# Patient Record
Sex: FEMALE | Race: ASIAN | Employment: UNEMPLOYED | URBAN - METROPOLITAN AREA
[De-identification: names, ages, dates, MRNs, and addresses within clinical notes are randomized per-mention and may not be internally consistent; named-entity substitution may affect disease eponyms.]

---

## 2023-02-01 ENCOUNTER — HOSPITAL ENCOUNTER (EMERGENCY)
Age: 33
Discharge: HOME | End: 2023-02-01
Attending: EMERGENCY MEDICINE

## 2023-02-01 VITALS
TEMPERATURE: 98.3 F | RESPIRATION RATE: 16 BRPM | DIASTOLIC BLOOD PRESSURE: 73 MMHG | HEIGHT: 62 IN | SYSTOLIC BLOOD PRESSURE: 103 MMHG | HEART RATE: 68 BPM | OXYGEN SATURATION: 100 % | WEIGHT: 125.66 LBS | BODY MASS INDEX: 23.12 KG/M2

## 2023-02-01 DIAGNOSIS — R53.83 OTHER FATIGUE: Primary | ICD-10-CM

## 2023-02-01 LAB
A/G RATIO: 1.4 (ref 1.1–2.2)
ALBUMIN SERPL-MCNC: 4.4 G/DL (ref 3.4–5)
ALP BLD-CCNC: 49 U/L (ref 40–129)
ALT SERPL-CCNC: 8 U/L (ref 10–40)
ANION GAP SERPL CALCULATED.3IONS-SCNC: 8 MMOL/L (ref 3–16)
AST SERPL-CCNC: 14 U/L (ref 15–37)
BASOPHILS ABSOLUTE: 0.1 K/UL (ref 0–0.2)
BASOPHILS RELATIVE PERCENT: 0.9 %
BILIRUB SERPL-MCNC: 0.4 MG/DL (ref 0–1)
BILIRUBIN URINE: NEGATIVE
BLOOD, URINE: NEGATIVE
BUN BLDV-MCNC: 12 MG/DL (ref 7–20)
CALCIUM SERPL-MCNC: 9.5 MG/DL (ref 8.3–10.6)
CHLORIDE BLD-SCNC: 104 MMOL/L (ref 99–110)
CLARITY: CLEAR
CO2: 26 MMOL/L (ref 21–32)
COLOR: YELLOW
CREAT SERPL-MCNC: 0.5 MG/DL (ref 0.6–1.1)
EKG ATRIAL RATE: 62 BPM
EKG DIAGNOSIS: NORMAL
EKG P AXIS: 55 DEGREES
EKG P-R INTERVAL: 144 MS
EKG Q-T INTERVAL: 390 MS
EKG QRS DURATION: 82 MS
EKG QTC CALCULATION (BAZETT): 395 MS
EKG R AXIS: 79 DEGREES
EKG T AXIS: 60 DEGREES
EKG VENTRICULAR RATE: 62 BPM
EOSINOPHILS ABSOLUTE: 0.1 K/UL (ref 0–0.6)
EOSINOPHILS RELATIVE PERCENT: 1.4 %
GFR SERPL CREATININE-BSD FRML MDRD: >60 ML/MIN/{1.73_M2}
GLUCOSE BLD-MCNC: 87 MG/DL (ref 70–99)
GLUCOSE URINE: NEGATIVE MG/DL
GONADOTROPIN, CHORIONIC (HCG) QUANT: 1250 MIU/ML
HCG(URINE) PREGNANCY TEST: POSITIVE
HCT VFR BLD CALC: 37.2 % (ref 36–48)
HEMOGLOBIN: 12.3 G/DL (ref 12–16)
KETONES, URINE: NEGATIVE MG/DL
LEUKOCYTE ESTERASE, URINE: NEGATIVE
LYMPHOCYTES ABSOLUTE: 2.5 K/UL (ref 1–5.1)
LYMPHOCYTES RELATIVE PERCENT: 38.6 %
MCH RBC QN AUTO: 28.4 PG (ref 26–34)
MCHC RBC AUTO-ENTMCNC: 33.1 G/DL (ref 31–36)
MCV RBC AUTO: 85.7 FL (ref 80–100)
MICROSCOPIC EXAMINATION: NORMAL
MONOCYTES ABSOLUTE: 0.4 K/UL (ref 0–1.3)
MONOCYTES RELATIVE PERCENT: 7 %
NEUTROPHILS ABSOLUTE: 3.4 K/UL (ref 1.7–7.7)
NEUTROPHILS RELATIVE PERCENT: 52.1 %
NITRITE, URINE: NEGATIVE
PDW BLD-RTO: 14.6 % (ref 12.4–15.4)
PH UA: 6.5 (ref 5–8)
PLATELET # BLD: 303 K/UL (ref 135–450)
PMV BLD AUTO: 8.5 FL (ref 5–10.5)
POTASSIUM SERPL-SCNC: 4.3 MMOL/L (ref 3.5–5.1)
PROTEIN UA: NEGATIVE MG/DL
RBC # BLD: 4.34 M/UL (ref 4–5.2)
SODIUM BLD-SCNC: 138 MMOL/L (ref 136–145)
SPECIFIC GRAVITY UA: 1.02 (ref 1–1.03)
TOTAL PROTEIN: 7.5 G/DL (ref 6.4–8.2)
URINE REFLEX TO CULTURE: NORMAL
URINE TYPE: NORMAL
UROBILINOGEN, URINE: 0.2 E.U./DL
WBC # BLD: 6.4 K/UL (ref 4–11)

## 2023-02-01 PROCEDURE — 85025 COMPLETE CBC W/AUTO DIFF WBC: CPT

## 2023-02-01 PROCEDURE — 93005 ELECTROCARDIOGRAM TRACING: CPT | Performed by: EMERGENCY MEDICINE

## 2023-02-01 PROCEDURE — 81003 URINALYSIS AUTO W/O SCOPE: CPT

## 2023-02-01 PROCEDURE — 36415 COLL VENOUS BLD VENIPUNCTURE: CPT

## 2023-02-01 PROCEDURE — 84703 CHORIONIC GONADOTROPIN ASSAY: CPT

## 2023-02-01 PROCEDURE — 84702 CHORIONIC GONADOTROPIN TEST: CPT

## 2023-02-01 PROCEDURE — 99284 EMERGENCY DEPT VISIT MOD MDM: CPT

## 2023-02-01 PROCEDURE — 80053 COMPREHEN METABOLIC PANEL: CPT

## 2023-02-01 NOTE — ED TRIAGE NOTES
33y/o female presents to the ED with bilateral upper extremity weakness and generalized fatigue. -n/v/f/c/d.

## 2023-02-02 NOTE — ED PROVIDER NOTES
[address] ENCOUNTER          EM RESIDENT NOTE       Date of evaluation: 2/1/2023    Chief Complaint     Fatigue (Bilat upper extrm)      of Present Illness     Ayaan Munoz is a 27 y.o. female who presents with several days of generalized malaise and fatigue; she denies specific focality to bilateral upper extremities as per the triage note. She denies any recent febrile illness. She denies any other recent changes in her health. She is maintain adequate nutrition and hydration. She denies any recent trauma or injury. She does of note have a history of endometriosis and had difficulty conceiving and required two in vitro fertilization's to conceive for 2 children. Her LMP was in early January. She has had no vaginal discharge or abdominal pain. She had no change in her bowel function or urination. She is currently visiting the country while her  completes a fellowship and an ear nose and throat surgery    Review of Systems     Constitutional: + malaise, no fever, chills, unexpected weight change  Skin: no rashes or lesions  HEENT: no head trauma, headache; no vision changes, eye pain; no nasal discharge or congestion; no ear pain or change in hearing; no neck or throat pain  Respiratory: no shortness of breath or cough  Cardiovascular: no chest pain, palpitations, peripheral edema  Gastrointestinal: no abdominal pain, no change in appetite, no nausea, vomiting, constipation, diarrhea, no blood in stools  Genitourinary: no changes in urination; no pain with urination  Musculoskeletal: no muscle or joint pains  Neuro: no syncope or seizure  Psych: no changes in mood or depression      Past Medical, Surgical, Family, and Social History     She has no past medical history on file. She has no past surgical history on file. Her family history is not on file. She reports that she has never smoked.  She has never used smokeless tobacco. She reports that she does not drink alcohol and does not use drugs. Medications     Previous Medications    No medications on file       Allergies     She has No Known Allergies.     Physical Exam     INITIAL VITALS: BP: 103/73, Temp: 98.3 °F (36.8 °C), Heart Rate: 68, Resp: 16, SpO2: 100 %     General: alert and conversant in no distress  Skin: warm, dry and pink without noted rashes or lesions  Head: normocephalic atraumatic  Eyes: Pupils equal, extra ocular movements grossly intact  Mouth / Pharynx: moist mucus membranes without erythema or lesions noted  Neck: full range of motion without meningismus  Chest: no external findings or tenderness; clear to auscultation  Heart: regular normal S1 and S2 without murmur  noted  Abdomen: negative external findings, + bowel sounds, soft and non tender; no rebound; non distended  Extremities: well perfused with palpable distal pulses; full range of motion grossly intact, no edema      DiagnosticResults     EKG   NA    RADIOLOGY:  No orders to display       LABS:   Results for orders placed or performed during the hospital encounter of 02/01/23   Comprehensive Metabolic Panel   Result Value Ref Range    Sodium 138 136 - 145 mmol/L    Potassium 4.3 3.5 - 5.1 mmol/L    Chloride 104 99 - 110 mmol/L    CO2 26 21 - 32 mmol/L    Anion Gap 8 3 - 16    Glucose 87 70 - 99 mg/dL    BUN 12 7 - 20 mg/dL    Creatinine 0.5 (L) 0.6 - 1.1 mg/dL    Est, Glom Filt Rate >60 >60    Calcium 9.5 8.3 - 10.6 mg/dL    Total Protein 7.5 6.4 - 8.2 g/dL    Albumin 4.4 3.4 - 5.0 g/dL    Albumin/Globulin Ratio 1.4 1.1 - 2.2    Total Bilirubin 0.4 0.0 - 1.0 mg/dL    Alkaline Phosphatase 49 40 - 129 U/L    ALT 8 (L) 10 - 40 U/L    AST 14 (L) 15 - 37 U/L   CBC with Auto Differential   Result Value Ref Range    WBC 6.4 4.0 - 11.0 K/uL    RBC 4.34 4.00 - 5.20 M/uL    Hemoglobin 12.3 12.0 - 16.0 g/dL    Hematocrit 37.2 36.0 - 48.0 %    MCV 85.7 80.0 - 100.0 fL    MCH 28.4 26.0 - 34.0 pg    MCHC 33.1 31.0 - 36.0 g/dL    RDW 14.6 12.4 - 15.4 % Platelets 880 033 - 676 K/uL    MPV 8.5 5.0 - 10.5 fL    Neutrophils % 52.1 %    Lymphocytes % 38.6 %    Monocytes % 7.0 %    Eosinophils % 1.4 %    Basophils % 0.9 %    Neutrophils Absolute 3.4 1.7 - 7.7 K/uL    Lymphocytes Absolute 2.5 1.0 - 5.1 K/uL    Monocytes Absolute 0.4 0.0 - 1.3 K/uL    Eosinophils Absolute 0.1 0.0 - 0.6 K/uL    Basophils Absolute 0.1 0.0 - 0.2 K/uL   Urinalysis with Reflex to Culture    Specimen: Urine, clean catch   Result Value Ref Range    Color, UA Yellow Straw/Yellow    Clarity, UA Clear Clear    Glucose, Ur Negative Negative mg/dL    Bilirubin Urine Negative Negative    Ketones, Urine Negative Negative mg/dL    Specific Gravity, UA 1.020 1.005 - 1.030    Blood, Urine Negative Negative    pH, UA 6.5 5.0 - 8.0    Protein, UA Negative Negative mg/dL    Urobilinogen, Urine 0.2 <2.0 E.U./dL    Nitrite, Urine Negative Negative    Leukocyte Esterase, Urine Negative Negative    Microscopic Examination Not Indicated     Urine Type Voided     Urine Reflex to Culture Not Indicated    Pregnancy, Urine   Result Value Ref Range    HCG(Urine) Pregnancy Test POSITIVE Detects HCG level >20 MIU/mL   HCG Serum, Quantitative   Result Value Ref Range    hCG Quant 1250.0 <5.0 mIU/mL   EKG 12 Lead   Result Value Ref Range    Ventricular Rate 62 BPM    Atrial Rate 62 BPM    P-R Interval 144 ms    QRS Duration 82 ms    Q-T Interval 390 ms    QTc Calculation (Bazett) 395 ms    P Axis 55 degrees    R Axis 79 degrees    T Axis 60 degrees    Diagnosis       EKG performed in ER and to be interpreted by ER physician. Confirmed by MD, ER ([id number]),  Selin Giang ([phone]) on 2/1/2023 5:44:42 PM       ED BEDSIDE ULTRASOUND:  na    RECENT VITALS:  BP: 103/73, Temp: 98.3 °F (36.8 °C), Heart Rate: 68,Resp: 16, SpO2: 100 %     Procedures     na    ED Course     Nursing Notes, Past Medical Hx, Past Surgical Hx, Social Hx, Allergies, and Family Hx were reviewed.     The patient was given the followingmedications:  No orders of the defined types were placed in this encounter. CONSULTS:  None    MEDICAL DECISION MAKING / ASSESSMENT / Juan Najera is a 27 y.o. female presents with several days of generalized malaise and fatigue. There is no focality on her exam and her work-up from triage is overall reassuring with no major actionable abnormalities on CBC renal panel or urinalysis. Of note her urine pregnancy test is positive. At the time of her examination she is quite pleased by this result as she has had difficulty conceiving in the past.  She is explicit that she has no abdominal pain vaginal bleeding or other focal complaints and there is no concern for abnormal pregnancy implantation at this time and the pregnancy is certainly very early likely less than 2 weeks gestation. Given her work-up there is little indication for further testing or treatment in the emergency department. We did discuss the utility of a quantitative beta-hCG to better characterize the age of her gestation and she is agreeable to awaiting this result. It does result at 1250 which again could be consistent with her expected gestational age. Consideration is given to the utility of ultrasound imaging at this time however given the early gestation and the patient is without any concerning features for ectopic pregnancy including abdominal pain or vaginal bleeding we will defer this testing for now and the patient is certainly well educated as to warning signs of this complication. Given this course there is no indication for further testing or treatment in the emergency department patient is appropriate for discharge with plan to establish OB care in the community. Is given return precautions should she develop abdominal pain or vaginal discharge. This patient was also evaluated by the attending physician. All care plans werediscussed and agreed upon. Clinical Impression     1.  Other fatigue        Disposition     PATIENT REFERRED TO:  Peewee Munoz MD  [address], [address] [place] [address]  [phone]    Schedule an appointment as soon as possible for a visit in 1 week  To establish care    DISCHARGE MEDICATIONS:  New Prescriptions    No medications on file       DISPOSITION Decision To Discharge 02/01/2023 08:14:00 PM        Harsha Argueta MD  Resident  02/01/23 2018

## 2023-02-02 NOTE — DISCHARGE INSTRUCTIONS
You were evaluated for generalized fatigue for several days. Your laboratory evaluation was reassuring and you are found with an early pregnancy. Please note that your quantitative beta-hCG is 1250 which could correlate with a gestation of around 3 to 4 weeks. This is consistent with your last menstrual period. Regardless at this time is appropriate to discharge you home however as we discussed it is important to establish care with a OB doctor and a referral was provided here which you may use as needed to establish care in the next 1 to 2 weeks. If you develop any new symptoms particularly abdominal pain or vaginal bleeding please return immediately to the emergency department for evaluation.

## 2023-02-02 NOTE — ED PROVIDER NOTES
ED Attending Attestation Note     Date of evaluation: 2/1/2023    This patient was seen by the resident. I have seen and examined the patient, agree with the workup, evaluation, management and diagnosis. The care plan has been discussed. Briefly, Eliana Maldonado is a 27 y.o. female with a PMH inclusive of endometriosis who presents for evaluation of fatigue x one week, worse today. She denies focal weakness or numbness. States that the upper extremity weakness reported was more of heaviness and \"not muscular\" in association with fatigue has been eating and drinking normally though has some mild nausea. Denies respiratory symptoms    LMP 1/1/23. Denies vaginal bleeding, abdominal pain. Notable exam findings include no focal neuro deficits. Normal mental status. Assessment/ Medical Decision Making:     Pregnancy test positive here. Remainder of labs within normal limits. Do feel that this would explain her fatigue. 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 We discussed testing for viral illnesses but patient wished to defer at this time which I think is reasonable. With her being very early by LMP, without abdominal pain or vaginal bleeding, do not feel that she needs imaging at this time. She will establish care for follow up and will return to the Ed for worsening symptoms.       Isis Hernandez MD  02/01/23 2021